# Patient Record
Sex: MALE | Employment: OTHER
[De-identification: names, ages, dates, MRNs, and addresses within clinical notes are randomized per-mention and may not be internally consistent; named-entity substitution may affect disease eponyms.]

---

## 2022-07-30 ENCOUNTER — TELEPHONE ENCOUNTER (OUTPATIENT)
Age: 82
End: 2022-07-30

## 2022-07-31 ENCOUNTER — TELEPHONE ENCOUNTER (OUTPATIENT)
Age: 82
End: 2022-07-31

## 2024-02-27 ENCOUNTER — OV EP (OUTPATIENT)
Dept: URBAN - METROPOLITAN AREA CLINIC 60 | Facility: CLINIC | Age: 84
End: 2024-02-27

## 2024-02-27 NOTE — HPI-PREVIOUS PROCEDURES
Upper endoscopy Dr. Dennis Clemente:2009 Irregular Z-line biopsies unremarkable, small hiatal hernia, erosive gastritis H. pylori negative, normal duodenum with unremarkable biopsies  Colonoscopy 2009:Dr. Dennis Clemente Procedure performed without difficulty, fair bowel prep, normal exam to the normal terminal ileum. No polyps, no diverticulosis

## 2024-02-27 NOTE — HPI-PREVIOUS IMAGING
Normal small bowel series 2010 CT scan 2009 with and without contrast done for weight loss: Liver spleen pancreas adrenal glands appear normal.  Gallbladder unremarkable.  15 mm lesion in the right kidney.  Small fat-containing umbilical hernia.  Borderline enlarged prostate.  Nonobstructing right renal calculus.

## 2024-12-23 NOTE — PHYSICAL EXAM NECK/THYROID:
no cervical lymphadenopathy , no masses
Pt is independent with transfers and ambulation with RW. Educated pt on RLE NWB, pt able to maintain throughout. Educated pt on ascending/descending stairs via bumping up/down on bottom. Educated pt on ascending/descending platform stairs with RW. Pt verbalized understanding/agreement to above recommendations/education. All pts questions answered to pt satisfaction. Pt requires no skilled PT and is cleared from PT services for dc home.